# Patient Record
(demographics unavailable — no encounter records)

---

## 2024-12-18 NOTE — PHYSICAL EXAM
[de-identified] : Foot/ankle (right)   Inspection  Skin: normal  Swelling: none  Arch: Mild to moderate pes planus Tendon defect: none   Palpation  Tenderness: Very mild Location: Tibiotalar joint  Ankle ROM  Dorsiflexion: normal  Plantarflexion: normal  Eversion: normal  Inversion: normal  Toe flexion: normal  Toe extension: normal  Foot Motor Strength  Ankle plantarflexion: 5/5  Dorsiflexion: 5/5  Inversion: 5/5  Eversion: 5/5   Sensory index  Normal Distal pulses Normal   Stress test  Negative: ankle anterior drawer, ankle lateral tilt, subtalar inversion, subtalar eversion   Special Tests  Single Leg Hop: normal  Zhu test: normal  Kleigers test: normal  Tinnels: normal

## 2024-12-18 NOTE — HISTORY OF PRESENT ILLNESS
[de-identified] : AMAYA TREJO is a 56 year old male presenting with acute on chronic right greater than left ankle pain.  He states he has had issues of the ankles for many years but recently it has flared up on him again.  He feels most of the pain through the anterior aspect of the right ankle as well as tightness and stiffness into the calf.  He has most of his pain and discomfort when he is walking or going up and down stairs.  Does not have any pain when he is seated.  Here today for further evaluation.

## 2024-12-18 NOTE — DISCUSSION/SUMMARY
[de-identified] : AMAYA TREJO is a 56 year old male presenting with acute on chronic right greater than left anterior ankle pain consistent with ankle impingement likely related to pes planus as well as calf tightness.  Plan: 1.  Superfeet inserts recommended 2.  Referral given to physical therapy and home exercise program provided 3.  Diclofenac 50 mg to be taken twice a day as needed prescribed 4.  Patient will follow-up as needed

## 2025-01-10 NOTE — CONSULT LETTER
[Dear  ___] : Dear  [unfilled], [Consult Letter:] : I had the pleasure of evaluating your patient, [unfilled]. [Please see my note below.] : Please see my note below. [Consult Closing:] : Thank you very much for allowing me to participate in the care of this patient.  If you have any questions, please do not hesitate to contact me. [Sincerely,] : Sincerely, [[email or web address]] : Ania Hughes MD PhD

## 2025-01-10 NOTE — DISCUSSION/SUMMARY
[de-identified] : I examined, evaluated, and discussed with the patient. The patient has low back symptoms for 3 weeks. He also has right leg pain.   Based on physical exam and image findings, the patient diagnosis is possible lumbar disc herniation causing right leg radiculopathy.   Treatment plan is medications PRN including Medrol kamar and PT. He has declofenac. PT at Dr. He's place.   The patient understands everything and all questions were answered. The patient will return in 3 weeks.

## 2025-01-10 NOTE — HISTORY OF PRESENT ILLNESS
[de-identified] : The patient is 56 years old male who has low back pain for 3 weeks. The patient also right leg pain. No clear cause of the symptoms.   Pain Level:  7-8 Duration of Symptoms:  3 weeks Symptom course:  Same Accident:  No   Previous Treatment:    Pain meds:  Yes, diclofenac    Physical therapy:  No    Epidural steroid injection:  No

## 2025-01-10 NOTE — PHYSICAL EXAM
[de-identified] : The patient is alert, cooperative, and oriented x 3. Pupils are equal and round. The patient does not have skin rash.   Gait is normal. Back ROM is within normal range. Tenderness at PVM. SLR positive right side. DTR normal range. Motor and sensory are basically normal throughout bilateral L/E. Circulation of legs is normal. Bladder and bowel functions are normal. [de-identified] : Lumbar spine x-ray taken recently at [facility] shows mild-moderate degenerative changes.

## 2025-01-31 NOTE — PHYSICAL EXAM
[de-identified] : The patient is alert, cooperative, and oriented x 3. Pupils are equal and round. The patient does not have skin rash.   Gait is normal. Back ROM is within normal range. Tenderness at PVM. SLR positive right side. DTR normal range. Motor and sensory are basically normal throughout bilateral L/E. Circulation of legs is normal. Bladder and bowel functions are normal. [de-identified] : Lumbar spine x-ray taken recently at [facility] shows mild-moderate degenerative changes.

## 2025-01-31 NOTE — DISCUSSION/SUMMARY
[de-identified] : I examined, evaluated, and discussed with the patient. Follow-up of low back pain and right leg pain Low back pain much better but right leg pain still there. Right leg pain gets much worse with more than 5 minutes walking. He finished the steroid kamar.  Based on physical exam and image findings, the patient diagnosis is possible lumbar disc herniation causing right leg radiculopathy.   Treatment plan is medications PRN and to continue PT. He is not interested in [facility] at this point.   The patient understands everything and all questions were answered. The patient will return in 4 weeks.

## 2025-01-31 NOTE — HISTORY OF PRESENT ILLNESS
[de-identified] : Follow-up of low back pain and right leg pain Low back pain much better but right leg pain still there. Right leg pain gets much worse with more than 5 minutes walking. He did PT 4 times.  Below is the history of initial visit. The patient is 56 years old male who has low back pain for 3 weeks. The patient also right leg pain. No clear cause of the symptoms.   Pain Level:  7-8 Duration of Symptoms:  3 weeks Symptom course:  Same Accident:  No   Previous Treatment:    Pain meds:  Yes, diclofenac    Physical therapy:  No    Epidural steroid injection:  No

## 2025-02-28 NOTE — REASON FOR VISIT
Bed: RT2  Expected date:   Expected time:   Means of arrival:   Comments:  [facility]   [Follow-Up Visit] : a follow-up visit for [Back Pain] : back pain [Radiculopathy] : radiculopathy

## 2025-02-28 NOTE — DISCUSSION/SUMMARY
[de-identified] : I examined, evaluated, and discussed with the patient. Follow-up of low back pain and right leg pain His symptom is much better. Today, pain is 4. He can walk long distance. He is doing PT and home exercise. He takes pain meds (Meloxicam and muscle relaxer).  Based on physical exam and image findings, the patient diagnosis is possible lumbar disc herniation causing right leg radiculopathy.   Treatment plan is medications PRN and to continue PT and home exercise. He is not interested in [facility] at this point. Recommend to decrease pain meds.   The patient understands everything and all questions were answered. The patient will return in 6-8 weeks.

## 2025-02-28 NOTE — HISTORY OF PRESENT ILLNESS
[de-identified] : Follow-up of low back pain and right leg pain His symptom is much better. Today, pain is 4. He can walk long distance. He is doing PT and home exercise. He takes pain meds (Meloxicam and muscle relaxer).  Below is the history of initial visit. The patient is 56 years old male who has low back pain for 3 weeks. The patient also right leg pain. No clear cause of the symptoms.   Pain Level:  7-8 Duration of Symptoms:  3 weeks Symptom course:  Same Accident:  No   Previous Treatment:    Pain meds:  Yes, diclofenac    Physical therapy:  No    Epidural steroid injection:  No

## 2025-02-28 NOTE — PHYSICAL EXAM
[de-identified] : The patient is alert, cooperative, and oriented x 3. Pupils are equal and round. The patient does not have skin rash.   Gait is normal. Back ROM is within normal range. Tenderness at PVM. SLR positive right side. DTR normal range. Motor and sensory are basically normal throughout bilateral L/E. Circulation of legs is normal. Bladder and bowel functions are normal. [de-identified] : Lumbar spine x-ray taken recently at [facility] shows mild-moderate degenerative changes.

## 2025-03-18 NOTE — DISCUSSION/SUMMARY
[de-identified] : AMAYA TREJO is a 56 year old male presenting with acute on chronic right greater than left anterior ankle pain consistent with ankle impingement likely related to pes planus as well as calf tightness.  Patient has gone to physical therapy for 2 months and completed this also using shoe inserts with significant relief and resolution of his pain.  Plan: 1.  Patient will continue with Superfeet inserts 2.  Patient will continue home exercise program as needed 3.  Patient will follow-up as needed

## 2025-03-18 NOTE — HISTORY OF PRESENT ILLNESS
[de-identified] : AMAYA TREJO is a 56 year old male presenting with acute on chronic right greater than left ankle pain.  He states he has had issues of the ankles for many years but recently it has flared up on him again.  He feels most of the pain through the anterior aspect of the right ankle as well as tightness and stiffness into the calf.  He has most of his pain and discomfort when he is walking or going up and down stairs.  Does not have any pain when he is seated.  Here today for further evaluation.  Interval history: Patient states he had been doing physical therapy over the last 2 months and completed it and had a complete resolution of his pain.

## 2025-03-18 NOTE — PHYSICAL EXAM
[de-identified] : Foot/ankle (right)   Inspection  Skin: normal  Swelling: none  Arch: Mild pes planus Tendon defect: none   Palpation  Tenderness: none Location: Tibiotalar joint  Ankle ROM  Dorsiflexion: normal  Plantarflexion: normal  Eversion: normal  Inversion: normal  Toe flexion: normal  Toe extension: normal  Foot Motor Strength  Ankle plantarflexion: 5/5  Dorsiflexion: 5/5  Inversion: 5/5  Eversion: 5/5   Sensory index  Normal Distal pulses Normal   Stress test  Negative: ankle anterior drawer, ankle lateral tilt, subtalar inversion, subtalar eversion   Special Tests  Single Leg Hop: normal  Zhu test: normal  Kleigers test: normal  Tinnels: normal

## 2025-04-11 NOTE — DISCUSSION/SUMMARY
[de-identified] : I examined, evaluated, and discussed with the patient. Follow-up of low back pain and right leg pain His symptom is getting better again. Pain 3-4. He is doing PT and home exercise. He takes pain meds (Meloxicam and muscle relaxer) almost every day.  Based on physical exam and image findings, the patient diagnosis is possible lumbar disc herniation causing right leg radiculopathy.   Treatment plan is medications PRN and to continue PT and home exercise. Recommend to decrease pain meds (hopefully no pain meds next time).   The patient understands everything and all questions were answered. The patient will return in 8 weeks.

## 2025-04-11 NOTE — HISTORY OF PRESENT ILLNESS
[de-identified] : Follow-up of low back pain and right leg pain His symptom is getting better again. Pain 3-4. He is doing PT and home exercise. He takes pain meds (Meloxicam and muscle relaxer) almost every day.  Below is the history of initial visit. The patient is 56 years old male who has low back pain for 3 weeks. The patient also right leg pain. No clear cause of the symptoms.   Pain Level:  7-8 Duration of Symptoms:  3 weeks Symptom course:  Same Accident:  No   Previous Treatment:    Pain meds:  Yes, diclofenac    Physical therapy:  No    Epidural steroid injection:  No

## 2025-04-11 NOTE — PHYSICAL EXAM
[de-identified] : The patient is alert, cooperative, and oriented x 3. Pupils are equal and round. The patient does not have skin rash.   Gait is normal. Back ROM is within normal range. Tenderness at PVM. SLR positive right side. DTR normal range. Motor and sensory are basically normal throughout bilateral L/E. Circulation of legs is normal. Bladder and bowel functions are normal. [de-identified] : Lumbar spine x-ray taken recently at [facility] shows mild-moderate degenerative changes.

## 2025-06-13 NOTE — DISCUSSION/SUMMARY
[de-identified] : I examined, evaluated, and discussed with the patient. Follow-up of low back pain and right leg pain His symptom is getting better, pain is 3/10 today. He can move right leg freely now. He is doing PT and home exercise. He is not taking pain meds now.  Based on physical exam and image findings, the patient diagnosis is possible lumbar disc herniation causing right leg radiculopathy.   Treatment plan is medications PRN and to continue PT and home exercise. New PT prescribed.   The patient understands everything and all questions were answered. The patient will return in 2-3 months.

## 2025-06-13 NOTE — PHYSICAL EXAM
[de-identified] : The patient is alert, cooperative, and oriented x 3. Pupils are equal and round. The patient does not have skin rash.   Gait is normal. Back ROM is within normal range. Tenderness at PVM. SLR negative right side now. DTR normal range. Motor and sensory are basically normal throughout bilateral L/E. Circulation of legs is normal. Bladder and bowel functions are normal. [de-identified] : Lumbar spine x-ray taken recently at [facility] shows mild-moderate degenerative changes.

## 2025-06-13 NOTE — HISTORY OF PRESENT ILLNESS
[de-identified] : Follow-up of low back pain and right leg pain His symptom is getting better, pain is 3/10 today. He is doing PT and home exercise. He is not taking pain meds now.  Below is the history of initial visit. The patient is 56 years old male who has low back pain for 3 weeks. The patient also right leg pain. No clear cause of the symptoms.   Pain Level:  7-8 Duration of Symptoms:  3 weeks Symptom course:  Same Accident:  No   Previous Treatment:    Pain meds:  Yes, diclofenac    Physical therapy:  No    Epidural steroid injection:  No